# Patient Record
Sex: MALE | Race: WHITE | NOT HISPANIC OR LATINO | Employment: OTHER | URBAN - METROPOLITAN AREA
[De-identification: names, ages, dates, MRNs, and addresses within clinical notes are randomized per-mention and may not be internally consistent; named-entity substitution may affect disease eponyms.]

---

## 2018-10-19 ENCOUNTER — ESTABLISHED COMPREHENSIVE EXAM (OUTPATIENT)
Dept: URBAN - METROPOLITAN AREA CLINIC 43 | Facility: CLINIC | Age: 73
End: 2018-10-19

## 2018-10-19 DIAGNOSIS — D31.32: ICD-10-CM

## 2018-10-19 DIAGNOSIS — H25.13: ICD-10-CM

## 2018-10-19 PROCEDURE — G8428 CUR MEDS NOT DOCUMENT: HCPCS

## 2018-10-19 PROCEDURE — 1036F TOBACCO NON-USER: CPT

## 2018-10-19 PROCEDURE — 92014 COMPRE OPH EXAM EST PT 1/>: CPT

## 2018-10-19 PROCEDURE — G8785 BP SCRN NO PERF AT INTERVAL: HCPCS

## 2018-10-19 PROCEDURE — G9903 PT SCRN TBCO ID AS NON USER: HCPCS

## 2018-10-19 PROCEDURE — 92015 DETERMINE REFRACTIVE STATE: CPT

## 2018-10-19 ASSESSMENT — VISUAL ACUITY
OD_CC: J2
OS_BAT: 20/200
OD_SC: J5
OD_BAT: 20/200
OS_SC: 20/50-2
OS_SC: J4
OD_SC: 20/50-1
OS_CC: J2

## 2018-10-19 ASSESSMENT — TONOMETRY
OD_IOP_MMHG: 12
OS_IOP_MMHG: 12

## 2021-03-17 NOTE — PATIENT DISCUSSION
MONITOR: Patient has narrow angle(s) but these are not deemed to be closeable at this time. However, angle closure is not always predictable and the signs and symptoms of this were discussed. The patient was asked to follow up with repeat gonioscopy as scheduled. Gonio done today twice using flanged and unflanged lenses. Some narrowing superiorly OU but pigmented trabecular meshwork seen through most angles. Angles appear narrow on VH screening. Angles remained gonioscopically unchanged after dilation.

## 2023-08-24 ENCOUNTER — NEW PATIENT (OUTPATIENT)
Dept: URBAN - METROPOLITAN AREA CLINIC 43 | Facility: CLINIC | Age: 78
End: 2023-08-24

## 2023-08-24 DIAGNOSIS — D31.32: ICD-10-CM

## 2023-08-24 DIAGNOSIS — Z98.890: ICD-10-CM

## 2023-08-24 DIAGNOSIS — H25.13: ICD-10-CM

## 2023-08-24 DIAGNOSIS — H35.3131: ICD-10-CM

## 2023-08-24 PROCEDURE — 92134 CPTRZ OPH DX IMG PST SGM RTA: CPT

## 2023-08-24 PROCEDURE — 92015 DETERMINE REFRACTIVE STATE: CPT

## 2023-08-24 PROCEDURE — 92004 COMPRE OPH EXAM NEW PT 1/>: CPT

## 2023-08-24 PROCEDURE — 92250 FUNDUS PHOTOGRAPHY W/I&R: CPT

## 2023-08-24 ASSESSMENT — VISUAL ACUITY
OS_SC: 20/60-2
OD_BAT: 20/40-1
OS_BAT: 20/60+1
OD_SC: J8-
OS_SC: J6
OS_CC: J4
OD_CC: J2
OD_SC: 20/25-2

## 2023-08-24 ASSESSMENT — TONOMETRY
OS_IOP_MMHG: 12
OD_IOP_MMHG: 17

## 2023-09-11 ENCOUNTER — CONSULTATION/EVALUATION (OUTPATIENT)
Dept: URBAN - METROPOLITAN AREA CLINIC 43 | Facility: CLINIC | Age: 78
End: 2023-09-11

## 2023-09-11 DIAGNOSIS — H35.3131: ICD-10-CM

## 2023-09-11 DIAGNOSIS — H25.813: ICD-10-CM

## 2023-09-11 DIAGNOSIS — D31.32: ICD-10-CM

## 2023-09-11 DIAGNOSIS — Z98.890: ICD-10-CM

## 2023-09-11 PROCEDURE — 99214 OFFICE O/P EST MOD 30 MIN: CPT

## 2023-09-11 PROCEDURE — 92025-3 CORNEAL TOPO, REFUSED

## 2023-09-11 PROCEDURE — 92134 CPTRZ OPH DX IMG PST SGM RTA: CPT

## 2023-09-11 PROCEDURE — 92136 OPHTHALMIC BIOMETRY: CPT

## 2023-09-11 PROCEDURE — V2799PMN IMPRIMIS PRED-MOXI-NEPAF 5ML

## 2023-09-11 ASSESSMENT — VISUAL ACUITY
OS_SC: 20/50-2
OD_BAT: 20/40
OD_SC: 20/30
OS_BAT: 20/60
OD_SC: J8
OS_SC: J10
OU_SC: J6

## 2023-09-11 ASSESSMENT — TONOMETRY
OS_IOP_MMHG: 15
OD_IOP_MMHG: 16

## 2023-09-19 ENCOUNTER — PRE-OP/H&P (OUTPATIENT)
Dept: URBAN - METROPOLITAN AREA SURGERY 14 | Facility: SURGERY | Age: 78
End: 2023-09-19

## 2023-09-19 ENCOUNTER — SURGERY/PROCEDURE (OUTPATIENT)
Dept: URBAN - METROPOLITAN AREA CLINIC 43 | Facility: CLINIC | Age: 78
End: 2023-09-19

## 2023-09-19 DIAGNOSIS — H25.813: ICD-10-CM

## 2023-09-19 DIAGNOSIS — Z98.890: ICD-10-CM

## 2023-09-19 DIAGNOSIS — D31.32: ICD-10-CM

## 2023-09-19 DIAGNOSIS — H35.3131: ICD-10-CM

## 2023-09-19 PROCEDURE — 99211HP H&P OFFICE/OUTPATIENT VISIT, EST

## 2023-09-19 PROCEDURE — 66984 XCAPSL CTRC RMVL W/O ECP: CPT

## 2023-09-20 ENCOUNTER — POST OP/EVAL OF SECOND EYE (OUTPATIENT)
Dept: URBAN - METROPOLITAN AREA CLINIC 43 | Facility: CLINIC | Age: 78
End: 2023-09-20

## 2023-09-20 DIAGNOSIS — H25.812: ICD-10-CM

## 2023-09-20 DIAGNOSIS — Z98.890: ICD-10-CM

## 2023-09-20 DIAGNOSIS — Z96.1: ICD-10-CM

## 2023-09-20 DIAGNOSIS — H35.3131: ICD-10-CM

## 2023-09-20 DIAGNOSIS — D31.32: ICD-10-CM

## 2023-09-20 PROCEDURE — 99024 POSTOP FOLLOW-UP VISIT: CPT

## 2023-09-20 PROCEDURE — 92012 INTRM OPH EXAM EST PATIENT: CPT

## 2023-09-20 ASSESSMENT — VISUAL ACUITY
OD_SC: 20/30-1
OS_SC: J6-
OS_BAT: 20/60
OD_SC: J8-
OS_SC: 20/50-1

## 2023-09-20 ASSESSMENT — TONOMETRY
OS_IOP_MMHG: 16
OD_IOP_MMHG: 19

## 2023-09-22 ENCOUNTER — PRE-OP/H&P (OUTPATIENT)
Dept: URBAN - METROPOLITAN AREA SURGERY 14 | Facility: SURGERY | Age: 78
End: 2023-09-22

## 2023-09-22 ENCOUNTER — TECH ONLY (OUTPATIENT)
Dept: URBAN - METROPOLITAN AREA CLINIC 39 | Facility: CLINIC | Age: 78
End: 2023-09-22

## 2023-09-22 ENCOUNTER — SURGERY/PROCEDURE (OUTPATIENT)
Dept: URBAN - METROPOLITAN AREA CLINIC 43 | Facility: CLINIC | Age: 78
End: 2023-09-22

## 2023-09-22 DIAGNOSIS — Z96.1: ICD-10-CM

## 2023-09-22 DIAGNOSIS — H25.812: ICD-10-CM

## 2023-09-22 DIAGNOSIS — D31.32: ICD-10-CM

## 2023-09-22 DIAGNOSIS — H35.3131: ICD-10-CM

## 2023-09-22 DIAGNOSIS — Z98.890: ICD-10-CM

## 2023-09-22 PROCEDURE — 99211T TECH SERVICE

## 2023-09-22 PROCEDURE — 66984 XCAPSL CTRC RMVL W/O ECP: CPT

## 2023-09-22 PROCEDURE — 99211HP H&P OFFICE/OUTPATIENT VISIT, EST

## 2023-09-22 ASSESSMENT — VISUAL ACUITY
OS_SC: 20/80
OS_PH: 20/50-2

## 2023-09-22 ASSESSMENT — TONOMETRY: OS_IOP_MMHG: 12

## 2023-09-25 ENCOUNTER — POST-OP (OUTPATIENT)
Dept: URBAN - METROPOLITAN AREA CLINIC 43 | Facility: CLINIC | Age: 78
End: 2023-09-25

## 2023-09-25 DIAGNOSIS — H35.3131: ICD-10-CM

## 2023-09-25 DIAGNOSIS — D31.32: ICD-10-CM

## 2023-09-25 DIAGNOSIS — Z98.890: ICD-10-CM

## 2023-09-25 DIAGNOSIS — Z96.1: ICD-10-CM

## 2023-09-25 PROCEDURE — 99024 POSTOP FOLLOW-UP VISIT: CPT

## 2023-09-25 ASSESSMENT — VISUAL ACUITY
OS_SC: 20/30-2
OD_SC: 20/40-1

## 2023-09-25 ASSESSMENT — TONOMETRY
OD_IOP_MMHG: 17
OS_IOP_MMHG: 18

## 2023-10-17 ENCOUNTER — POST-OP (OUTPATIENT)
Dept: URBAN - METROPOLITAN AREA CLINIC 43 | Facility: CLINIC | Age: 78
End: 2023-10-17

## 2023-10-17 DIAGNOSIS — D31.32: ICD-10-CM

## 2023-10-17 DIAGNOSIS — Z96.1: ICD-10-CM

## 2023-10-17 DIAGNOSIS — Z98.890: ICD-10-CM

## 2023-10-17 DIAGNOSIS — H35.3131: ICD-10-CM

## 2023-10-17 PROCEDURE — 99024 POSTOP FOLLOW-UP VISIT: CPT

## 2023-10-17 ASSESSMENT — VISUAL ACUITY
OS_SC: 20/50
OD_SC: 20/60+1

## 2023-10-17 ASSESSMENT — TONOMETRY
OS_IOP_MMHG: 12
OD_IOP_MMHG: 12

## 2023-10-31 ENCOUNTER — POST-OP (OUTPATIENT)
Dept: URBAN - METROPOLITAN AREA CLINIC 43 | Facility: CLINIC | Age: 78
End: 2023-10-31

## 2023-10-31 DIAGNOSIS — H35.3131: ICD-10-CM

## 2023-10-31 DIAGNOSIS — D31.32: ICD-10-CM

## 2023-10-31 DIAGNOSIS — Z96.1: ICD-10-CM

## 2023-10-31 DIAGNOSIS — Z98.890: ICD-10-CM

## 2023-10-31 PROCEDURE — 99024 POSTOP FOLLOW-UP VISIT: CPT

## 2023-10-31 PROCEDURE — 92250 FUNDUS PHOTOGRAPHY W/I&R: CPT

## 2023-10-31 PROCEDURE — 92134 CPTRZ OPH DX IMG PST SGM RTA: CPT

## 2023-10-31 ASSESSMENT — VISUAL ACUITY
OD_PH: 20/25
OS_PH: 20/50
OS_SC: 20/60-1
OD_SC: 20/50-2
OS_SC: J8
OD_SC: J8

## 2023-10-31 ASSESSMENT — TONOMETRY
OD_IOP_MMHG: 10
OS_IOP_MMHG: 10

## 2024-04-30 ENCOUNTER — COMPREHENSIVE EXAM (OUTPATIENT)
Dept: URBAN - METROPOLITAN AREA CLINIC 43 | Facility: CLINIC | Age: 79
End: 2024-04-30

## 2024-04-30 DIAGNOSIS — H35.3131: ICD-10-CM

## 2024-04-30 DIAGNOSIS — D31.32: ICD-10-CM

## 2024-04-30 DIAGNOSIS — Z96.1: ICD-10-CM

## 2024-04-30 DIAGNOSIS — Z98.890: ICD-10-CM

## 2024-04-30 PROCEDURE — 92014 COMPRE OPH EXAM EST PT 1/>: CPT

## 2024-04-30 PROCEDURE — 92015 DETERMINE REFRACTIVE STATE: CPT

## 2024-04-30 ASSESSMENT — VISUAL ACUITY
OD_CC: 20/20-1
OD_SC: 20/40-1
OD_SC: J4+
OS_SC: J8-
OS_SC: 20/60-1
OS_CC: 20/40

## 2024-04-30 ASSESSMENT — TONOMETRY
OD_IOP_MMHG: 10
OS_IOP_MMHG: 13

## 2025-05-01 ENCOUNTER — COMPREHENSIVE EXAM (OUTPATIENT)
Age: 80
End: 2025-05-01

## 2025-05-01 DIAGNOSIS — D31.32: ICD-10-CM

## 2025-05-01 DIAGNOSIS — H35.3131: ICD-10-CM

## 2025-05-01 DIAGNOSIS — Z96.1: ICD-10-CM

## 2025-05-01 DIAGNOSIS — Z98.890: ICD-10-CM

## 2025-05-01 PROCEDURE — 92015 DETERMINE REFRACTIVE STATE: CPT

## 2025-05-01 PROCEDURE — 92014 COMPRE OPH EXAM EST PT 1/>: CPT
